# Patient Record
Sex: MALE | Race: WHITE | URBAN - METROPOLITAN AREA
[De-identification: names, ages, dates, MRNs, and addresses within clinical notes are randomized per-mention and may not be internally consistent; named-entity substitution may affect disease eponyms.]

---

## 2018-08-27 ENCOUNTER — OUTPATIENT (OUTPATIENT)
Dept: OUTPATIENT SERVICES | Facility: HOSPITAL | Age: 53
LOS: 1 days | End: 2018-08-27
Payer: MEDICARE

## 2018-08-27 VITALS
DIASTOLIC BLOOD PRESSURE: 68 MMHG | SYSTOLIC BLOOD PRESSURE: 106 MMHG | WEIGHT: 132.06 LBS | RESPIRATION RATE: 16 BRPM | HEART RATE: 78 BPM | TEMPERATURE: 98 F | HEIGHT: 62 IN | OXYGEN SATURATION: 98 %

## 2018-08-27 DIAGNOSIS — Z98.890 OTHER SPECIFIED POSTPROCEDURAL STATES: Chronic | ICD-10-CM

## 2018-08-27 DIAGNOSIS — Z01.818 ENCOUNTER FOR OTHER PREPROCEDURAL EXAMINATION: ICD-10-CM

## 2018-08-27 DIAGNOSIS — G72.9 MYOPATHY, UNSPECIFIED: ICD-10-CM

## 2018-08-27 DIAGNOSIS — G89.29 OTHER CHRONIC PAIN: ICD-10-CM

## 2018-08-27 LAB
HCT VFR BLD CALC: 38.5 % — LOW (ref 39–50)
HGB BLD-MCNC: 13.4 G/DL — SIGNIFICANT CHANGE UP (ref 13–17)
MCHC RBC-ENTMCNC: 31.3 PG — SIGNIFICANT CHANGE UP (ref 27–34)
MCHC RBC-ENTMCNC: 34.8 GM/DL — SIGNIFICANT CHANGE UP (ref 32–36)
MCV RBC AUTO: 90 FL — SIGNIFICANT CHANGE UP (ref 80–100)
NRBC # BLD: 0 /100 WBCS — SIGNIFICANT CHANGE UP (ref 0–0)
PLATELET # BLD AUTO: 250 K/UL — SIGNIFICANT CHANGE UP (ref 150–400)
RBC # BLD: 4.28 M/UL — SIGNIFICANT CHANGE UP (ref 4.2–5.8)
RBC # FLD: 12.5 % — SIGNIFICANT CHANGE UP (ref 10.3–14.5)
WBC # BLD: 7.72 K/UL — SIGNIFICANT CHANGE UP (ref 3.8–10.5)
WBC # FLD AUTO: 7.72 K/UL — SIGNIFICANT CHANGE UP (ref 3.8–10.5)

## 2018-08-27 PROCEDURE — 36415 COLL VENOUS BLD VENIPUNCTURE: CPT

## 2018-08-27 PROCEDURE — G0463: CPT

## 2018-08-27 PROCEDURE — 93010 ELECTROCARDIOGRAM REPORT: CPT

## 2018-08-27 PROCEDURE — 93005 ELECTROCARDIOGRAM TRACING: CPT

## 2018-08-27 PROCEDURE — 85027 COMPLETE CBC AUTOMATED: CPT

## 2018-08-27 RX ORDER — SODIUM CHLORIDE 9 MG/ML
1000 INJECTION, SOLUTION INTRAVENOUS
Qty: 0 | Refills: 0 | Status: DISCONTINUED | OUTPATIENT
Start: 2018-09-06 | End: 2018-09-21

## 2018-08-27 NOTE — ASU DISCHARGE PLAN (ADULT/PEDIATRIC). - FOLLOWUP APPOINTMENT CLINIC/PHYSICIAN
Call Dr. Hernandez's office [phone] to make an appointment to be seen in 7-10 days Please call Dr. HELEN Hernandez's office ([phone]) to schedule a follow-up appointment to be seen in 7-10 days.

## 2018-08-27 NOTE — H&P PST ADULT - FAMILY HISTORY
Mother  Still living? Yes, Estimated age: 71-80  Family history of cirrhosis of liver, Age at diagnosis: Age Unknown     Sibling  Still living? No  Family history of kidney disease, Age at diagnosis: Age Unknown

## 2018-08-27 NOTE — ASU DISCHARGE PLAN (ADULT/PEDIATRIC). - MEDICATION SUMMARY - MEDICATIONS TO TAKE
I will START or STAY ON the medications listed below when I get home from the hospital:    oxyCODONE-acetaminophen 5 mg-325 mg oral tablet  -- 1 tab(s) by mouth every 6 hours, As Needed -for severe pain MDD:4  -- Caution federal law prohibits the transfer of this drug to any person other  than the person for whom it was prescribed.  May cause drowsiness.  Alcohol may intensify this effect.  Use care when operating dangerous machinery.  This prescription cannot be refilled.  This product contains acetaminophen.  Do not use  with any other product containing acetaminophen to prevent possible liver damage.  Using more of this medication than prescribed may cause serious breathing problems.    -- Indication: For PAIN    Relpax 40 mg oral tablet  -- Indication: For HOME MEDICATION    Lyrica 100 mg oral capsule  -- 1 cap(s) by mouth 3 times a day  -- Indication: For HOME MEDICATION    Cymbalta 60 mg oral delayed release capsule  -- 1 cap(s) by mouth once a day  -- Indication: For HOME MEDICATION    Claritin 24 Hour Allergy 10 mg oral tablet  -- 1 tab(s) by mouth once a day  -- Indication: For HOME MEDICATION    baclofen 20 mg oral tablet  -- 1 tab(s) by mouth 6 times a day  -- Indication: For HOME MEDICATION    tiZANidine 4 mg oral tablet  -- 2 tab(s) by mouth every 8 hours  -- Indication: For HOME MEDICATION    levothyroxine 100 mcg (0.1 mg) oral tablet  -- 1 tab(s) by mouth once a day  -- Indication: For HOME MEDICATION    Vitamin D3 1000 intl units oral tablet  -- 1 tab(s) by mouth once a day  -- Indication: For HOME MEDICATION

## 2018-08-27 NOTE — H&P PST ADULT - HISTORY OF PRESENT ILLNESS
51 y/o male with generalized body aches and weakness to legs and arms since 2008.Was unable to get pain free from all the medications.  Noticed worsening of symptoms and was advised biopsy

## 2018-08-27 NOTE — H&P PST ADULT - NSANTHOSAYNRD_GEN_A_CORE
No. LULU screening performed.  STOP BANG Legend: 0-2 = LOW Risk; 3-4 = INTERMEDIATE Risk; 5-8 = HIGH Risk

## 2018-08-27 NOTE — ASU DISCHARGE PLAN (ADULT/PEDIATRIC). - INSTRUCTIONS
Follow all verbal and written instructions. Take medications as prescribed. DO NOT drive, operate machinery, and/or make important decisions while on prescription pain medication. DO NOT hesitate to call Doctor's office with questions or concerns. Certain prescription pain medication can cause constipation; take a stool softener such as Colace 100mg 3 x a day, to avoid the constipating effects of prescription pain medication. REST! Apply ice packs to incision sites 20 mins on, 20 mins off every 4 hours for first 24 hours.  If taking narcotic pain medications, may take COLACE (OTC stool softener) as directed to prevent constipation.

## 2018-08-27 NOTE — ASU DISCHARGE PLAN (ADULT/PEDIATRIC). - NOTIFY
Numbness, color, or temperature change to extremity/Pain not relieved by Medications/Bleeding that does not stop/Swelling that continues

## 2018-09-05 ENCOUNTER — TRANSCRIPTION ENCOUNTER (OUTPATIENT)
Age: 53
End: 2018-09-05

## 2018-09-06 ENCOUNTER — RESULT REVIEW (OUTPATIENT)
Age: 53
End: 2018-09-06

## 2018-09-06 ENCOUNTER — OUTPATIENT (OUTPATIENT)
Dept: OUTPATIENT SERVICES | Facility: HOSPITAL | Age: 53
LOS: 1 days | End: 2018-09-06
Payer: MEDICARE

## 2018-09-06 VITALS
OXYGEN SATURATION: 100 % | DIASTOLIC BLOOD PRESSURE: 87 MMHG | HEART RATE: 83 BPM | SYSTOLIC BLOOD PRESSURE: 126 MMHG | RESPIRATION RATE: 15 BRPM

## 2018-09-06 VITALS
RESPIRATION RATE: 13 BRPM | WEIGHT: 128.97 LBS | HEART RATE: 75 BPM | SYSTOLIC BLOOD PRESSURE: 134 MMHG | HEIGHT: 62 IN | OXYGEN SATURATION: 100 % | TEMPERATURE: 98 F | DIASTOLIC BLOOD PRESSURE: 84 MMHG

## 2018-09-06 DIAGNOSIS — Z01.818 ENCOUNTER FOR OTHER PREPROCEDURAL EXAMINATION: ICD-10-CM

## 2018-09-06 DIAGNOSIS — Z98.890 OTHER SPECIFIED POSTPROCEDURAL STATES: Chronic | ICD-10-CM

## 2018-09-06 DIAGNOSIS — G72.9 MYOPATHY, UNSPECIFIED: ICD-10-CM

## 2018-09-06 PROCEDURE — 20205 DEEP MUSCLE BIOPSY: CPT

## 2018-09-06 PROCEDURE — 88300 SURGICAL PATH GROSS: CPT | Mod: 26

## 2018-09-06 PROCEDURE — 88300 SURGICAL PATH GROSS: CPT

## 2018-09-06 RX ORDER — ELETRIPTAN HYDROBROMIDE 20 MG/1
0 TABLET, FILM COATED ORAL
Qty: 0 | Refills: 0 | COMMUNITY

## 2018-09-06 RX ORDER — SODIUM CHLORIDE 9 MG/ML
1000 INJECTION, SOLUTION INTRAVENOUS
Qty: 0 | Refills: 0 | Status: DISCONTINUED | OUTPATIENT
Start: 2018-09-06 | End: 2018-09-06

## 2018-09-06 RX ORDER — OXYCODONE HYDROCHLORIDE 5 MG/1
5 TABLET ORAL ONCE
Qty: 0 | Refills: 0 | Status: DISCONTINUED | OUTPATIENT
Start: 2018-09-06 | End: 2018-09-06

## 2018-09-06 RX ORDER — LORATADINE 10 MG/1
1 TABLET ORAL
Qty: 0 | Refills: 0 | COMMUNITY

## 2018-09-06 RX ORDER — BACLOFEN 100 %
1 POWDER (GRAM) MISCELLANEOUS
Qty: 0 | Refills: 0 | COMMUNITY

## 2018-09-06 RX ORDER — HYDROMORPHONE HYDROCHLORIDE 2 MG/ML
0.5 INJECTION INTRAMUSCULAR; INTRAVENOUS; SUBCUTANEOUS
Qty: 0 | Refills: 0 | Status: DISCONTINUED | OUTPATIENT
Start: 2018-09-06 | End: 2018-09-06

## 2018-09-06 RX ORDER — LEVOTHYROXINE SODIUM 125 MCG
1 TABLET ORAL
Qty: 0 | Refills: 0 | COMMUNITY

## 2018-09-06 RX ORDER — CEFAZOLIN SODIUM 1 G
2000 VIAL (EA) INJECTION ONCE
Qty: 0 | Refills: 0 | Status: COMPLETED | OUTPATIENT
Start: 2018-09-06 | End: 2018-09-06

## 2018-09-06 RX ORDER — ACETAMINOPHEN WITH CODEINE 300MG-30MG
1 TABLET ORAL
Qty: 0 | Refills: 0 | COMMUNITY

## 2018-09-06 RX ORDER — ONDANSETRON 8 MG/1
4 TABLET, FILM COATED ORAL ONCE
Qty: 0 | Refills: 0 | Status: DISCONTINUED | OUTPATIENT
Start: 2018-09-06 | End: 2018-09-06

## 2018-09-06 RX ORDER — TIZANIDINE 4 MG/1
2 TABLET ORAL
Qty: 0 | Refills: 0 | COMMUNITY

## 2018-09-06 RX ORDER — CHOLECALCIFEROL (VITAMIN D3) 125 MCG
1 CAPSULE ORAL
Qty: 0 | Refills: 0 | COMMUNITY

## 2018-09-06 RX ORDER — DULOXETINE HYDROCHLORIDE 30 MG/1
1 CAPSULE, DELAYED RELEASE ORAL
Qty: 0 | Refills: 0 | COMMUNITY

## 2018-09-06 RX ORDER — CHLORHEXIDINE GLUCONATE 213 G/1000ML
1 SOLUTION TOPICAL ONCE
Qty: 0 | Refills: 0 | Status: COMPLETED | OUTPATIENT
Start: 2018-09-06 | End: 2018-09-06

## 2018-09-06 RX ADMIN — SODIUM CHLORIDE 75 MILLILITER(S): 9 INJECTION, SOLUTION INTRAVENOUS at 08:31

## 2018-09-06 RX ADMIN — SODIUM CHLORIDE 75 MILLILITER(S): 9 INJECTION, SOLUTION INTRAVENOUS at 11:07

## 2018-09-06 RX ADMIN — CHLORHEXIDINE GLUCONATE 1 APPLICATION(S): 213 SOLUTION TOPICAL at 08:31

## 2018-09-06 NOTE — BRIEF OPERATIVE NOTE - PROCEDURE
<<-----Click on this checkbox to enter Procedure Muscle biopsy  09/06/2018  LEFT biceps brachi  Active  FALLON

## 2019-06-12 NOTE — H&P PST ADULT - DOES PATIENT MEET CRITERIA FOR SEPSIS
Blanka Carr is a 67year old male presenting for a procedure for ED&C to right upper back. Denies known Latex allergy or symptoms of Latex sensitivity. PCP: Katerina Fofana MD  Tobacco--negative  Medications verified, no changes per patient. Allergies verified, no changes per patient. Pharmacy verified for this encounter 6/12/2019.
CHIEF COMPLAINT:    Procedure [facility])    HISTORY OF PRESENT ILLNESS:  Jaye Manzano is a 67year old White male who presents for treatment of:    Skin, right upper back, shave biopsy: Â    - Basal cell carcinoma, superficial type. Â      Significant sun in his youth. Wears sunscreen now. No personal history of skin cancer. Mother with history of skin cancer. REVIEW OF SYSTEMS:  No fevers or chills  No swollen glands  No recent weight changes    PHYSICAL EXAMINATION:  There were no vitals filed for this visit. General:  well-appearing, oriented, no acute distress  Mood/affect:  normal  Back:  Tan scaly stuck-on papules, brown regular macules, right upper back with pink scaly plaque    ASSESSMENT/PLAN:  1. BCC - right upper back  Site confirmed with photos  ED+C today    ELECTRODESICCATION AND CURETTAGE PROCEDURE NOTE  Location: right upper back  Pre-op diagnosis: BCC  Size after first pass: 1.6 cm  Number of passes: 3    After discussion of risks including bleeding, scarring, infection, and recurrence or persistence, informed consent was obtained. The designated area was prepped with Hibiclens and anesthesia was obtained with 2 mL of 1% lidocaine with 1:100,000 epinephrine. The lesion was removed with a curette tool for the first pass. This was followed by electrodesiccation to include 3 mm margins. This cycle was repeated for a total of 3 passes. Final hemostasis was obtained with electrodesiccation, and there were no complications. Wound care instructions were provided in verbal and written form.         Patient will follow-up in 6 months for FAUSTINO
No